# Patient Record
Sex: FEMALE | Race: WHITE | NOT HISPANIC OR LATINO | URBAN - METROPOLITAN AREA
[De-identification: names, ages, dates, MRNs, and addresses within clinical notes are randomized per-mention and may not be internally consistent; named-entity substitution may affect disease eponyms.]

---

## 2017-01-04 ENCOUNTER — EMERGENCY (EMERGENCY)
Facility: HOSPITAL | Age: 82
LOS: 1 days | Discharge: HOME | End: 2017-01-04
Attending: EMERGENCY MEDICINE | Admitting: EMERGENCY MEDICINE
Payer: MEDICARE

## 2017-01-04 VITALS
RESPIRATION RATE: 18 BRPM | SYSTOLIC BLOOD PRESSURE: 119 MMHG | HEART RATE: 78 BPM | OXYGEN SATURATION: 93 % | TEMPERATURE: 98 F | DIASTOLIC BLOOD PRESSURE: 66 MMHG

## 2017-01-04 VITALS
RESPIRATION RATE: 18 BRPM | DIASTOLIC BLOOD PRESSURE: 63 MMHG | SYSTOLIC BLOOD PRESSURE: 137 MMHG | TEMPERATURE: 98 F | HEART RATE: 82 BPM | OXYGEN SATURATION: 96 %

## 2017-01-04 DIAGNOSIS — Z91.013 ALLERGY TO SEAFOOD: ICD-10-CM

## 2017-01-04 DIAGNOSIS — Z88.1 ALLERGY STATUS TO OTHER ANTIBIOTIC AGENTS STATUS: ICD-10-CM

## 2017-01-04 DIAGNOSIS — E78.5 HYPERLIPIDEMIA, UNSPECIFIED: ICD-10-CM

## 2017-01-04 DIAGNOSIS — R55 SYNCOPE AND COLLAPSE: ICD-10-CM

## 2017-01-04 DIAGNOSIS — Z88.0 ALLERGY STATUS TO PENICILLIN: ICD-10-CM

## 2017-01-04 LAB
ALBUMIN SERPL ELPH-MCNC: 3.1 G/DL — LOW (ref 3.4–5)
ALBUMIN SERPL ELPH-MCNC: 3.1 G/DL — LOW (ref 3.4–5)
ALP SERPL-CCNC: 163 U/L — HIGH (ref 40–120)
ALP SERPL-CCNC: 169 U/L — HIGH (ref 40–120)
ALT FLD-CCNC: 33 U/L — SIGNIFICANT CHANGE UP (ref 12–42)
ALT FLD-CCNC: 35 U/L — SIGNIFICANT CHANGE UP (ref 12–42)
ANION GAP SERPL CALC-SCNC: 12 MMOL/L — SIGNIFICANT CHANGE UP (ref 9–16)
ANION GAP SERPL CALC-SCNC: 13 MMOL/L — SIGNIFICANT CHANGE UP (ref 9–16)
APTT BLD: 30.7 SEC — SIGNIFICANT CHANGE UP (ref 27.5–37.4)
AST SERPL-CCNC: 41 U/L — HIGH (ref 15–37)
AST SERPL-CCNC: 49 U/L — HIGH (ref 15–37)
BILIRUB SERPL-MCNC: 0.9 MG/DL — SIGNIFICANT CHANGE UP (ref 0.2–1.2)
BILIRUB SERPL-MCNC: 1 MG/DL — SIGNIFICANT CHANGE UP (ref 0.2–1.2)
BUN SERPL-MCNC: 20 MG/DL — SIGNIFICANT CHANGE UP (ref 7–23)
BUN SERPL-MCNC: 20 MG/DL — SIGNIFICANT CHANGE UP (ref 7–23)
CALCIUM SERPL-MCNC: 8.6 MG/DL — SIGNIFICANT CHANGE UP (ref 8.5–10.5)
CALCIUM SERPL-MCNC: 8.7 MG/DL — SIGNIFICANT CHANGE UP (ref 8.5–10.5)
CHLORIDE SERPL-SCNC: 100 MMOL/L — SIGNIFICANT CHANGE UP (ref 96–108)
CHLORIDE SERPL-SCNC: 99 MMOL/L — SIGNIFICANT CHANGE UP (ref 96–108)
CO2 SERPL-SCNC: 20 MMOL/L — LOW (ref 22–31)
CO2 SERPL-SCNC: 23 MMOL/L — SIGNIFICANT CHANGE UP (ref 22–31)
CREAT SERPL-MCNC: 1 MG/DL — SIGNIFICANT CHANGE UP (ref 0.5–1.3)
CREAT SERPL-MCNC: 1.04 MG/DL — SIGNIFICANT CHANGE UP (ref 0.5–1.3)
GLUCOSE SERPL-MCNC: 90 MG/DL — SIGNIFICANT CHANGE UP (ref 70–99)
GLUCOSE SERPL-MCNC: 93 MG/DL — SIGNIFICANT CHANGE UP (ref 70–99)
HCT VFR BLD CALC: 29.3 % — LOW (ref 34.5–45)
HCT VFR BLD CALC: 31 % — LOW (ref 34.5–45)
HGB BLD-MCNC: 10 G/DL — LOW (ref 11.5–15.5)
HGB BLD-MCNC: 9.9 G/DL — LOW (ref 11.5–15.5)
INR BLD: 0.91 — SIGNIFICANT CHANGE UP (ref 0.88–1.16)
MCHC RBC-ENTMCNC: 31.6 PG — SIGNIFICANT CHANGE UP (ref 27–34)
MCHC RBC-ENTMCNC: 32.3 G/DL — SIGNIFICANT CHANGE UP (ref 32–36)
MCHC RBC-ENTMCNC: 32.6 PG — SIGNIFICANT CHANGE UP (ref 27–34)
MCHC RBC-ENTMCNC: 33.8 G/DL — SIGNIFICANT CHANGE UP (ref 32–36)
MCV RBC AUTO: 96.4 FL — SIGNIFICANT CHANGE UP (ref 80–100)
MCV RBC AUTO: 98.1 FL — SIGNIFICANT CHANGE UP (ref 80–100)
NT-PROBNP SERPL-SCNC: 1105 PG/ML — HIGH
NT-PROBNP SERPL-SCNC: 1112 PG/ML — HIGH
PLATELET # BLD AUTO: 376 K/UL — SIGNIFICANT CHANGE UP (ref 150–400)
PLATELET # BLD AUTO: 385 K/UL — SIGNIFICANT CHANGE UP (ref 150–400)
POTASSIUM SERPL-MCNC: 4.4 MMOL/L — SIGNIFICANT CHANGE UP (ref 3.5–5.3)
POTASSIUM SERPL-MCNC: 4.7 MMOL/L — SIGNIFICANT CHANGE UP (ref 3.5–5.3)
POTASSIUM SERPL-SCNC: 4.4 MMOL/L — SIGNIFICANT CHANGE UP (ref 3.5–5.3)
POTASSIUM SERPL-SCNC: 4.7 MMOL/L — SIGNIFICANT CHANGE UP (ref 3.5–5.3)
PROT SERPL-MCNC: 6.6 G/DL — SIGNIFICANT CHANGE UP (ref 6.4–8.2)
PROT SERPL-MCNC: 6.7 G/DL — SIGNIFICANT CHANGE UP (ref 6.4–8.2)
PROTHROM AB SERPL-ACNC: 10.1 SEC — SIGNIFICANT CHANGE UP (ref 10–13.1)
RBC # BLD: 3.04 M/UL — LOW (ref 3.8–5.2)
RBC # BLD: 3.16 M/UL — LOW (ref 3.8–5.2)
RBC # FLD: 16.3 % — SIGNIFICANT CHANGE UP (ref 10.3–16.9)
RBC # FLD: 16.5 % — SIGNIFICANT CHANGE UP (ref 10.3–16.9)
SODIUM SERPL-SCNC: 133 MMOL/L — LOW (ref 135–145)
SODIUM SERPL-SCNC: 134 MMOL/L — LOW (ref 135–145)
TROPONIN I SERPL-MCNC: <0.015 NG/ML — SIGNIFICANT CHANGE UP (ref 0.01–0.04)
TROPONIN I SERPL-MCNC: <0.015 NG/ML — SIGNIFICANT CHANGE UP (ref 0.01–0.04)
WBC # BLD: 14 K/UL — HIGH (ref 3.8–10.5)
WBC # BLD: 17.4 K/UL — HIGH (ref 3.8–10.5)
WBC # FLD AUTO: 14 K/UL — HIGH (ref 3.8–10.5)
WBC # FLD AUTO: 17.4 K/UL — HIGH (ref 3.8–10.5)

## 2017-01-04 PROCEDURE — 93010 ELECTROCARDIOGRAM REPORT: CPT

## 2017-01-04 PROCEDURE — 85730 THROMBOPLASTIN TIME PARTIAL: CPT

## 2017-01-04 PROCEDURE — 93005 ELECTROCARDIOGRAM TRACING: CPT

## 2017-01-04 PROCEDURE — 99285 EMERGENCY DEPT VISIT HI MDM: CPT | Mod: 25

## 2017-01-04 PROCEDURE — 71010: CPT | Mod: 26

## 2017-01-04 PROCEDURE — 80053 COMPREHEN METABOLIC PANEL: CPT

## 2017-01-04 PROCEDURE — 85610 PROTHROMBIN TIME: CPT

## 2017-01-04 PROCEDURE — 36415 COLL VENOUS BLD VENIPUNCTURE: CPT

## 2017-01-04 PROCEDURE — 83880 ASSAY OF NATRIURETIC PEPTIDE: CPT

## 2017-01-04 PROCEDURE — 85027 COMPLETE CBC AUTOMATED: CPT

## 2017-01-04 PROCEDURE — 99284 EMERGENCY DEPT VISIT MOD MDM: CPT | Mod: 25

## 2017-01-04 PROCEDURE — 71045 X-RAY EXAM CHEST 1 VIEW: CPT

## 2017-01-04 PROCEDURE — 84484 ASSAY OF TROPONIN QUANT: CPT

## 2017-01-04 RX ORDER — SODIUM CHLORIDE 9 MG/ML
3 INJECTION INTRAMUSCULAR; INTRAVENOUS; SUBCUTANEOUS ONCE
Qty: 0 | Refills: 0 | Status: COMPLETED | OUTPATIENT
Start: 2017-01-04 | End: 2017-01-04

## 2017-01-04 RX ADMIN — SODIUM CHLORIDE 3 MILLILITER(S): 9 INJECTION INTRAMUSCULAR; INTRAVENOUS; SUBCUTANEOUS at 15:00

## 2017-01-04 NOTE — ED PROVIDER NOTE - CONSTITUTIONAL, MLM
normal... Well appearing, well nourished, awake, alert, oriented to person, place, time/situation and in no apparent distress. Well appearing, thin habitus, awake, alert, oriented to person, place, time/situation and in no apparent distress.

## 2017-01-04 NOTE — ED ADULT NURSE NOTE - FALL HARM RISK
coagulation(Bleeding disorder R/T clinical cond/anti-coags)/bones(Osteoporosis,prev fx,steroid use,metastatic bone ca/age(85 years old or older)

## 2017-01-04 NOTE — ED PROVIDER NOTE - PHYSICAL EXAMINATION
General: Alert in no acute distress  Head: atraumatic  Eyes: clear bilaterally, EOMI  Lungs: clear to auscultation bilaterally  Cardiac: Normal rate and rhythm  Abdominal: non tender abdomen, no guarding/rebound  Neurological: no gross focal deficits- motor or sensory  Musculoskeletal: normal movement of all extremities  Psych: appropriate mood and affect Dark green stool.

## 2017-01-04 NOTE — ED ADULT NURSE NOTE - OBJECTIVE STATEMENT
Pt BIBA to ED A&Ox 1 from [facility]. Per EMS, pt had a syncopal episode at the nursing home and became nauseas. Pt was given zofran IV at nursing home via butterfly catheter. Pt denies CP/SOB, fevers, n/v/d at this time. Pt appears in NAD. Pt BIBA to ED A&Ox 1 from [facility]. Per EMS, pt had a syncopal episode at the nursing home and became nauseas. Pt was given zofran IV at nursing home via butterfly catheter. Pt denies CP/SOB, fevers, n/v/d at this time. Pt appears in NAD. Chart received from [facility], per records pt is DNR/DNI. Pt BIBA to ED A&Ox 2 from [facility]. Per pt daughter in law Pt was given zofran IV at nursing home via butterfly catheter. Pt denies CP/SOB, fevers, n/v/d at this time. Pt appears in NAD. Chart received from [facility], per records pt is DNR/DNI. Pt BIBA to ED A&Ox 2 from rehab. Per pt daughter in law, pt was doing PT at rehab and fainted, was caught by the staff there, was taken to her bed and fainted again then became nauseas and vomited blood. Denies hitting head, denies LOC. Pt was given zofran IV at nursing home via butterfly catheter. Pt denies CP/SOB, fevers, n/v/d at this time. Pt appears in NAD. Chart received from nursing home, per records pt is DNR/DNI.

## 2017-01-04 NOTE — ED PROVIDER NOTE - MEDICAL DECISION MAKING DETAILS
Pt with episode of syncope while in PT less than a minute long. Noted to be vomiting once after NBNB.  Pt states asymptomatic right now. Low H/H noted on initial CBC on 12/28. Work up reviewed in ED. Repeat CBC with no decrease. Discussed thoroughly with pt';s son, PMD and daughter in law. Do not want aggressive measures, even refusing to give UA. State given age would prefer return to rehab center. Dw pmd will keep close follow up. Pt with episode of syncope while in PT, less than a minute long. Noted to be vomiting once after NBNB.  Pt states asymptomatic right now. Low H/H noted on initial CBC on 12/28. Work up reviewed in ED. Repeat CBC with no decrease. Discussed thoroughly with pt';s son, PMD and daughter in law. Do not want aggressive measures including abx, even refusing to give UA stating pt has had multiple UTIs in the past. Pt DNR. Son, health care proxy would prefer pt not be admitted unless absolutely necessary.  State given age would prefer return to rehab center. Discussed care with pmd, pt will keep close follow up with pmd this week .

## 2017-01-04 NOTE — ED ADULT NURSE NOTE - PMH
Anemia    Cerebral infarct    Chronic CHF    GERD (gastroesophageal reflux disease)    HLD (hyperlipidemia)    Hypothyroid    Pacemaker    PVD (peripheral vascular disease)

## 2017-01-04 NOTE — ED ADULT NURSE NOTE - CHPI ED SYMPTOMS NEG
no vomiting/no palpitations/no dizziness/no weakness/no chills/no shortness of breath/no chest discomfort/no loss of consciousness/no left arm pain/no fever/no cough/no jaw pain/no back pain/no edema/no crying/no chest pain

## 2017-01-04 NOTE — ED PROVIDER NOTE - DIAGNOSTIC INTERPRETATION
ER Physician: Lindsay Graham MD  CHEST XRAY INTERPRETATION: lungs clear, heart shadow normal, bony structures intact. Pacemaker in place, Lesion in left humerus (rad read  final noted to be benign) ER Physician: Lindsay Graham MD  CHEST XRAY INTERPRETATION: lungs clear, heart shadow normal, bony structures intact. Pacemaker in place, Lesion in right humerus (rad read  final noted to be benign)

## 2017-01-04 NOTE — ED ADULT TRIAGE NOTE - CHIEF COMPLAINT QUOTE
pt had a syncopal episode in nursing home during physical therapy this am ,felt nauseous ,zofran 4mg iv given in [place]

## 2017-01-04 NOTE — ED PROVIDER NOTE - OBJECTIVE STATEMENT
89 yo with hx of hypothryoidism prsenting after episode of syncope. Per daughter in law, at rehab center currently post hip fracture on L, when pt was being helped while waking with walker when event occurred.  Vomited once after. Noted to be anemic. 89 yo with hx of hypothryoidism prsenting after episode of syncope. Per daughter in law, at rehab center currently post hip fracture on L, when pt was being helped while waking with walker when event occurred.  Vomited once after. Noted to be anemic. Pt with Hb 6.9/20.7 on Dec 28th, 2016 right after hip surgery. Hip surgery on 25th Dec. 89 yo with hx of hypothyroidism presenting after episode of syncope. Per daughter in law, pt who is at rehab center currently post hip fracture on L, event occurred when pt was being helped while waking with walker.  Vomited once after when taken upstairs to bed. Noted to be anemic, Pt with Hb 6.9/20.7 on Dec 28th, 2016  after hip surgery. Hip surgery on 25th Dec. Rehab attendant stated episode less than 1 minute. Pt now denies cp, sob, fatigue, ab pain, n/v, fever.

## 2017-01-04 NOTE — ED ADULT NURSE REASSESSMENT NOTE - NS ED NURSE REASSESS COMMENT FT1
Pt/family refusing UA. Dr Graham aware. Pt turned and repositioned for comfort, pt with no complaints at this time. Will continue to monitor.